# Patient Record
Sex: FEMALE | Race: WHITE | NOT HISPANIC OR LATINO
[De-identification: names, ages, dates, MRNs, and addresses within clinical notes are randomized per-mention and may not be internally consistent; named-entity substitution may affect disease eponyms.]

---

## 2017-02-03 ENCOUNTER — APPOINTMENT (OUTPATIENT)
Dept: POPULATION HEALTH | Facility: CLINIC | Age: 47
End: 2017-02-03

## 2018-02-02 ENCOUNTER — APPOINTMENT (OUTPATIENT)
Dept: POPULATION HEALTH | Facility: CLINIC | Age: 48
End: 2018-02-02
Payer: COMMERCIAL

## 2018-02-02 PROCEDURE — 99212 OFFICE O/P EST SF 10 MIN: CPT | Mod: 25

## 2018-11-28 ENCOUNTER — EMERGENCY (EMERGENCY)
Facility: HOSPITAL | Age: 48
LOS: 1 days | Discharge: HOME | End: 2018-11-28
Attending: EMERGENCY MEDICINE
Payer: COMMERCIAL

## 2018-11-28 VITALS
HEIGHT: 59 IN | OXYGEN SATURATION: 97 % | DIASTOLIC BLOOD PRESSURE: 88 MMHG | WEIGHT: 115.08 LBS | SYSTOLIC BLOOD PRESSURE: 154 MMHG | HEART RATE: 98 BPM | TEMPERATURE: 99 F | RESPIRATION RATE: 20 BRPM

## 2018-11-28 LAB — HCG UR QL: NEGATIVE — SIGNIFICANT CHANGE UP

## 2018-11-28 PROCEDURE — 99284 EMERGENCY DEPT VISIT MOD MDM: CPT

## 2018-11-28 PROCEDURE — 72125 CT NECK SPINE W/O DYE: CPT | Mod: 26

## 2018-11-28 PROCEDURE — 72125 CT NECK SPINE W/O DYE: CPT

## 2018-11-28 PROCEDURE — 70450 CT HEAD/BRAIN W/O DYE: CPT

## 2018-11-28 PROCEDURE — 81025 URINE PREGNANCY TEST: CPT

## 2018-11-28 PROCEDURE — 70450 CT HEAD/BRAIN W/O DYE: CPT | Mod: 26

## 2018-11-28 PROCEDURE — 99284 EMERGENCY DEPT VISIT MOD MDM: CPT | Mod: 25

## 2018-11-28 RX ORDER — IBUPROFEN 200 MG
1 TABLET ORAL
Qty: 12 | Refills: 0 | OUTPATIENT
Start: 2018-11-28 | End: 2018-12-01

## 2018-11-28 NOTE — ED STATDOCS - PROGRESS NOTE DETAILS
48 y female presenting for headache x 2 days. Patient has had headaches in the past but never this bad in severity. Pain is bilateral starting from the back of her head and wrapping around to her forehead above her eyes. She took motrin 800 mg at 0730 today. She states the pain is currently 3/10. Neuro grossly intact on exam, CN II-XII intact.  PT evaluated by intake physician. HPI/PE/ROS as noted above. Will follow up plan per intake physician

## 2018-11-28 NOTE — ED ADULT NURSE NOTE - NSIMPLEMENTINTERV_GEN_ALL_ED
Implemented All Universal Safety Interventions:  [place] to call system. Call bell, personal items and telephone within reach. Instruct patient to call for assistance. Room bathroom lighting operational. Non-slip footwear when patient is off stretcher. Physically safe environment: no spills, clutter or unnecessary equipment. Stretcher in lowest position, wheels locked, appropriate side rails in place.

## 2018-11-28 NOTE — ED ADULT NURSE NOTE - OBJECTIVE STATEMENT
pt came in for a headache that started yesterday in the back of her head and now is up by her eyes 3/10 pain. pt stated she hasn't gotten her period for 1 month. she denied chest pain breaths are even and unlabored

## 2018-11-28 NOTE — ED STATDOCS - OBJECTIVE STATEMENT
47 y/o F pt presents to ED c/o constant headache associated with neck pain,  nausea since yesterday. Pain described as a pounding sensation; rated 3/10 and increase with bending down. Pt states she's had headache in the past but normally has a fever with it. Motrin taken at 7am. She admits to lifting heavy [place] boxes. No hx of frequent headaches or  family hx of migraines. Denies blurry vision, vomiting, dizziness, numbness, tingling, photophobia.  No further complaints at this time.

## 2018-11-28 NOTE — ED STATDOCS - ATTENDING CONTRIBUTION TO CARE
I, Arceila Ahumada, performed the initial face to face bedside interview with this patient regarding history of present illness, review of symptoms and relevant past medical, social and family history.  I completed an independent physical examination.  I was the initial provider who evaluated this patient. I have signed out the follow up of any pending tests (i.e. labs, radiological studies) to the ACP.  I have communicated the patient’s plan of care and disposition with the ACP.

## 2019-01-06 ENCOUNTER — TRANSCRIPTION ENCOUNTER (OUTPATIENT)
Age: 49
End: 2019-01-06

## 2019-01-22 ENCOUNTER — APPOINTMENT (OUTPATIENT)
Dept: POPULATION HEALTH | Facility: CLINIC | Age: 49
End: 2019-01-22
Payer: COMMERCIAL

## 2019-01-22 PROCEDURE — 86580 TB INTRADERMAL TEST: CPT

## 2020-01-10 ENCOUNTER — APPOINTMENT (OUTPATIENT)
Dept: POPULATION HEALTH | Facility: CLINIC | Age: 50
End: 2020-01-10
Payer: COMMERCIAL

## 2020-01-10 PROCEDURE — 99212 OFFICE O/P EST SF 10 MIN: CPT

## 2020-01-10 PROCEDURE — 86580 TB INTRADERMAL TEST: CPT

## 2020-05-28 ENCOUNTER — APPOINTMENT (OUTPATIENT)
Dept: POPULATION HEALTH | Facility: CLINIC | Age: 50
End: 2020-05-28
Payer: COMMERCIAL

## 2020-05-28 PROCEDURE — 99212 OFFICE O/P EST SF 10 MIN: CPT

## 2020-11-05 ENCOUNTER — TRANSCRIPTION ENCOUNTER (OUTPATIENT)
Age: 50
End: 2020-11-05

## 2020-12-30 ENCOUNTER — APPOINTMENT (OUTPATIENT)
Dept: POPULATION HEALTH | Facility: CLINIC | Age: 50
End: 2020-12-30

## 2021-03-22 NOTE — ED STATDOCS - NS ED MD EM SELECTION
3/29/2021 Patient: Harpreet Lopez YOB: 1943 Date of Visit: 3/22/2021 Renee Samuel MD 
[phone] [id number] [address] [employer] Dear Renee Samuel MD, Thank you for referring Ms. Jessy Elias to [facility] for evaluation. My notes for this consultation are attached. If you have questions, please do not hesitate to call me. I look forward to following your patient along with you.  
 
 
Sincerely, 
 
Arcelia Howe MD 
 

[id number] Detailed

## 2021-06-17 ENCOUNTER — APPOINTMENT (OUTPATIENT)
Dept: POPULATION HEALTH | Facility: CLINIC | Age: 51
End: 2021-06-17

## 2021-12-22 ENCOUNTER — APPOINTMENT (OUTPATIENT)
Dept: POPULATION HEALTH | Facility: CLINIC | Age: 51
End: 2021-12-22

## 2022-12-23 ENCOUNTER — APPOINTMENT (OUTPATIENT)
Dept: POPULATION HEALTH | Facility: CLINIC | Age: 52
End: 2022-12-23

## 2023-12-15 ENCOUNTER — APPOINTMENT (OUTPATIENT)
Age: 53
End: 2023-12-15

## 2024-06-01 ENCOUNTER — NON-APPOINTMENT (OUTPATIENT)
Age: 54
End: 2024-06-01

## 2025-08-01 ENCOUNTER — APPOINTMENT (OUTPATIENT)
Age: 55
End: 2025-08-01